# Patient Record
Sex: FEMALE | URBAN - METROPOLITAN AREA
[De-identification: names, ages, dates, MRNs, and addresses within clinical notes are randomized per-mention and may not be internally consistent; named-entity substitution may affect disease eponyms.]

---

## 2023-01-11 ENCOUNTER — WEB ENCOUNTER (OUTPATIENT)
Dept: URBAN - METROPOLITAN AREA CLINIC 111 | Facility: CLINIC | Age: 48
End: 2023-01-11

## 2023-01-11 ENCOUNTER — DASHBOARD ENCOUNTERS (OUTPATIENT)
Age: 48
End: 2023-01-11

## 2023-01-11 ENCOUNTER — OFFICE VISIT (OUTPATIENT)
Dept: URBAN - METROPOLITAN AREA CLINIC 111 | Facility: CLINIC | Age: 48
End: 2023-01-11
Payer: COMMERCIAL

## 2023-01-11 VITALS
TEMPERATURE: 97.3 F | WEIGHT: 142 LBS | HEART RATE: 98 BPM | DIASTOLIC BLOOD PRESSURE: 78 MMHG | HEIGHT: 67 IN | SYSTOLIC BLOOD PRESSURE: 114 MMHG | BODY MASS INDEX: 22.29 KG/M2

## 2023-01-11 DIAGNOSIS — R19.7 INTERMITTENT DIARRHEA: ICD-10-CM

## 2023-01-11 DIAGNOSIS — R10.13 DYSPEPSIA: ICD-10-CM

## 2023-01-11 DIAGNOSIS — Z12.11 SCREENING FOR COLON CANCER: ICD-10-CM

## 2023-01-11 PROBLEM — 162059005: Status: ACTIVE | Noted: 2023-01-11

## 2023-01-11 PROCEDURE — 99203 OFFICE O/P NEW LOW 30 MIN: CPT | Performed by: NURSE PRACTITIONER

## 2023-01-11 RX ORDER — SODIUM PICOSULFATE, MAGNESIUM OXIDE, AND ANHYDROUS CITRIC ACID 10; 3.5; 12 MG/160ML; G/160ML; G/160ML
160ML LIQUID ORAL ONCE
Qty: 1 | Refills: 0 | OUTPATIENT
Start: 2023-01-11 | End: 2023-01-12

## 2023-01-11 NOTE — HPI-TODAY'S VISIT:
47 female presents for a screening colonoscopy. Reports occasional stomach discomfort, heart burn and indigestion for about one year. Takes pepto bismuth with relief. Reports occasional diarrhea right after eating for years. She can't rule out what causes this, same food causes diarrhea one day and not other days.  Denies fever, chills, nausea, vomiting, early satiety, dysphagia, odynophagia, melena, hematochezia or weight loss. Denies family history of colon polyps or cancer. Denies any GI symptoms currently. Has normal bowel movement. No prior colonoscopy. Denies prior anesthesia. Not on blood thinner. Denies heart or lung diseases. Sees pcp for [facility].